# Patient Record
Sex: FEMALE | Race: BLACK OR AFRICAN AMERICAN | NOT HISPANIC OR LATINO
[De-identification: names, ages, dates, MRNs, and addresses within clinical notes are randomized per-mention and may not be internally consistent; named-entity substitution may affect disease eponyms.]

---

## 2022-08-18 ENCOUNTER — DASHBOARD ENCOUNTERS (OUTPATIENT)
Age: 47
End: 2022-08-18

## 2022-08-18 ENCOUNTER — OFFICE VISIT (OUTPATIENT)
Dept: URBAN - METROPOLITAN AREA CLINIC 52 | Facility: CLINIC | Age: 47
End: 2022-08-18
Payer: COMMERCIAL

## 2022-08-18 ENCOUNTER — LAB OUTSIDE AN ENCOUNTER (OUTPATIENT)
Dept: URBAN - METROPOLITAN AREA CLINIC 52 | Facility: CLINIC | Age: 47
End: 2022-08-18

## 2022-08-18 VITALS
HEART RATE: 82 BPM | OXYGEN SATURATION: 100 % | BODY MASS INDEX: 31.64 KG/M2 | HEIGHT: 67 IN | DIASTOLIC BLOOD PRESSURE: 83 MMHG | SYSTOLIC BLOOD PRESSURE: 129 MMHG | WEIGHT: 201.6 LBS | TEMPERATURE: 97.7 F

## 2022-08-18 DIAGNOSIS — Z12.11 COLON CANCER SCREENING: ICD-10-CM

## 2022-08-18 PROBLEM — 14760008: Status: ACTIVE | Noted: 2022-08-18

## 2022-08-18 PROCEDURE — 99202 OFFICE O/P NEW SF 15 MIN: CPT | Performed by: INTERNAL MEDICINE

## 2022-08-18 RX ORDER — AMLODIPINE AND BENAZEPRIL HYDROCHLORIDE 5; 20 MG/1; MG/1
CAPSULE ORAL
Qty: 30 CAPSULE | Status: ACTIVE | COMMUNITY

## 2022-08-18 RX ORDER — ALPRAZOLAM 0.5 MG/1
TAKE ONE TABLET BY MOUTH EVERY NIGHT AS NEEDED FOR ANXIETY TABLET ORAL
Qty: 30 UNSPECIFIED | Refills: 0 | Status: ACTIVE | COMMUNITY

## 2022-08-18 RX ORDER — BUSPIRONE HYDROCHLORIDE 10 MG/1
TABLET ORAL
Qty: 60 TABLET | Status: ACTIVE | COMMUNITY

## 2022-08-18 RX ORDER — METOPROLOL SUCCINATE 25 MG/1
TABLET, EXTENDED RELEASE ORAL
Qty: 30 TABLET | Status: ACTIVE | COMMUNITY

## 2022-08-18 NOTE — HPI-TODAY'S VISIT:
This is a 47 y.o. female with history of HTN and anxiety who presents to office for colon cancer screening. She does report new onset constipation in the past month. Constipation will flare hemorrhoids, which will cause occasional red blood on tissue. Otherwise, she has no other GI complaints. She denies family history of colon cancer. She has never had a colonoscopy.

## 2022-09-02 ENCOUNTER — OFFICE VISIT (OUTPATIENT)
Dept: URBAN - METROPOLITAN AREA SURGERY CENTER 17 | Facility: SURGERY CENTER | Age: 47
End: 2022-09-02